# Patient Record
Sex: FEMALE | Race: WHITE | NOT HISPANIC OR LATINO | URBAN - METROPOLITAN AREA
[De-identification: names, ages, dates, MRNs, and addresses within clinical notes are randomized per-mention and may not be internally consistent; named-entity substitution may affect disease eponyms.]

---

## 2018-02-25 ENCOUNTER — EMERGENCY (EMERGENCY)
Facility: HOSPITAL | Age: 53
LOS: 1 days | Discharge: HOME | End: 2018-02-25
Attending: EMERGENCY MEDICINE | Admitting: EMERGENCY MEDICINE
Payer: COMMERCIAL

## 2018-02-25 VITALS
DIASTOLIC BLOOD PRESSURE: 71 MMHG | OXYGEN SATURATION: 99 % | RESPIRATION RATE: 16 BRPM | HEART RATE: 67 BPM | SYSTOLIC BLOOD PRESSURE: 133 MMHG

## 2018-02-25 PROCEDURE — 99283 EMERGENCY DEPT VISIT LOW MDM: CPT

## 2018-02-25 PROCEDURE — 73130 X-RAY EXAM OF HAND: CPT | Mod: 26,LT

## 2018-02-25 RX ORDER — TETANUS TOXOID, REDUCED DIPHTHERIA TOXOID AND ACELLULAR PERTUSSIS VACCINE, ADSORBED 5; 2.5; 8; 8; 2.5 [IU]/.5ML; [IU]/.5ML; UG/.5ML; UG/.5ML; UG/.5ML
0.5 SUSPENSION INTRAMUSCULAR ONCE
Qty: 0 | Refills: 0 | Status: COMPLETED | OUTPATIENT
Start: 2018-02-25 | End: 2018-02-25

## 2018-02-25 RX ORDER — IBUPROFEN 200 MG
600 TABLET ORAL ONCE
Qty: 0 | Refills: 0 | Status: COMPLETED | OUTPATIENT
Start: 2018-02-25 | End: 2018-02-25

## 2018-02-25 RX ADMIN — TETANUS TOXOID, REDUCED DIPHTHERIA TOXOID AND ACELLULAR PERTUSSIS VACCINE, ADSORBED 0.5 MILLILITER(S): 5; 2.5; 8; 8; 2.5 SUSPENSION INTRAMUSCULAR at 15:45

## 2018-02-25 RX ADMIN — Medication 600 MILLIGRAM(S): at 15:47

## 2018-02-25 NOTE — ED ADULT TRIAGE NOTE - CHIEF COMPLAINT QUOTE
pt states that she tripped and fell on hospital grounds, c/o abrasions to palm of hands, abrasion to upper lip, pain to nose, denies LOC.  PMH hypothyroid, depression

## 2018-02-25 NOTE — ED PROVIDER NOTE - OBJECTIVE STATEMENT
Pt is a 53 y/o F w/ hypothyroidism and depression who presents to the ED s/p fall on hospital grounds today with abrasions to b/l palms, abrasion to upper lip and pain to nose. She notes that she fell on her hands and knees and her L side face hit the ground. Reports associated HA. Takes synthroid, Claritin and a nasal spray. Denies any mouth bleeding, chipped teeth, and biting down feels normal. Last tetanus shot unknown. Denies LOC, difficulty breathing, nausea, vomiting. Pt is a 53 y/o F w/ hypothyroidism and depression who presents to the ED s/p fall in the street today, states she tripped over a rope in the road.  Now presents with abrasions to b/l palms, abrasion to upper lip and pain to nose. She notes that she fell on her hands and knees and her L side face hit the ground. Denies any mouth bleeding, chipped teeth, and biting down feels normal. No dizziness, no CP/SOB, no abd pain, no n/v. Last tetanus shot unknown. Denies LOC, difficulty breathing, nausea, vomiting. Takes synthroid, Claritin and a nasal spray.

## 2018-02-25 NOTE — ED PROVIDER NOTE - ATTENDING CONTRIBUTION TO CARE
I performed the initial face to face bedside interview with this patient regarding history of present illness, review of symptoms and past medical, social and family history.  I completed an independent physical examination.  I was the initial provider who evaluated this patient.  The history, review of symptoms and examination was documented by the scribe in my presence and I attest to the accuracy of the documentation.  I have signed out the follow up of any pending tests (i.e. labs, radiological studies) to the PA.  I have discussed the patient’s plan of care and disposition with the PA. MOY Quach MD

## 2018-02-25 NOTE — ED PROVIDER NOTE - SKIN WOUND DESCRIPTION
superficial abrasion on L palm w/ surrounding tenderness and FROM of L wrist, nv intact. small R palm abrasion w/ FROM of wrist and nv intact.  abrasion to L lower cheek.

## 2018-02-25 NOTE — ED PROVIDER NOTE - CARE PLAN
Principal Discharge DX:	Fall  Assessment and plan of treatment:	Rest, drink plenty of fluids.  Advance activity as tolerated.  Continue all previously prescribed medications as directed. You can use motrin 600mg every 6-8 hours for pain or fever, and/or Tylenol 650 mg every 4 hours for pain/fever. Follow up with your primary care physician in 48-72 hours- bring copies of your results.  Return to the emergency department for chest pain, shortness of breath, dizziness, or worsening, concerning, or persistent symptoms.

## 2018-02-25 NOTE — ED PROVIDER NOTE - MEDICAL DECISION MAKING DETAILS
52F s/p mechanical fall presents with multiple areas of injury.  + abrasions to palms, ttp over L palm, plan for XR. Patient washed hands with soap and water, area covered with gauze.  L knee w contusion, FROM able to ambulate, no plan for XR.  Face with abrasion, no bony ttp.  Plan for tdap and motrin.

## 2019-06-28 NOTE — ED PROVIDER NOTE - NEUROLOGICAL SENSATION
present and normal in 4 extremities W Plasty Text: The lesion was extirpated to the level of the fat with a #15 scalpel blade.  Given the location of the defect, shape of the defect and the proximity to free margins a W-plasty was deemed most appropriate for repair.  Using a sterile surgical marker, the appropriate transposition arms of the W-plasty were drawn incorporating the defect and placing the expected incisions within the relaxed skin tension lines where possible.    The area thus outlined was incised deep to adipose tissue with a #15 scalpel blade.  The skin margins were undermined to an appropriate distance in all directions utilizing iris scissors.  The opposing transposition arms were then transposed into place in opposite direction and anchored with interrupted buried subcutaneous sutures.